# Patient Record
(demographics unavailable — no encounter records)

---

## 2025-03-21 NOTE — ASSESSMENT
[FreeTextEntry1] : cerumen bilateral -Wax removed bilateral -Recommend d/c all cotton swab use - recommend cont hearing protection at his work  snoring and concern for TIMA: - home sleep study reordered  anterior septal perforation: - denies hx of nasal drug use, surgeries, autoimmune disease, OTC decongestant sprays; however, he does note several traumas to the nose that could have contributed - is asymptomatic so would not intervene   dry mouth: - will check Sjogren's antibodies

## 2025-03-21 NOTE — HISTORY OF PRESENT ILLNESS
[de-identified] : 55yo male with right ear clogging. Notes that the right ear clogged when cleaning it and he feels the ears clog after the shower. He notes his hearing is down in both. Occasional ringing in the ears. He started using the amplifiers in the ears but still bothersome on the right. No infections or drainage. No history of ear infections. Also with dry mouth. Also with occasional cough. Also notes some occasional allergies and finds he has to cough. No hx of smoking. Also his wife notes that he snores loudly. He notes he sleeps well but he does nod off easily when he is watching tv.